# Patient Record
(demographics unavailable — no encounter records)

---

## 2018-04-13 NOTE — RADIOLOGY REPORT (SQ)
EXAM DESCRIPTION:  U/S THYROID/SFT TISS HD   NECK



COMPLETED DATE/TIME:  4/13/2018 3:41 pm



REASON FOR STUDY:  R THYROID NODULE E04.1  NONTOXIC SINGLE THYROID NODULE



COMPARISON:  None.



TECHNIQUE:  Dynamic and static gray-scale images acquired of the thyroid gland. Selected additional c
olor/power Doppler images recorded.  All images stored to PACS.



LIMITATIONS:  None.



FINDINGS:  RIGHT LOBE: Normal size, 3.5 x 2.5 x 2 cm.  Homogeneous echotexture.  There is a complex m
ass right lower pole thyroid with increased color flow, 2.7 x 2.3 x 2 cm in size.  Consider biopsy.

LEFT LOBE: Normal size, 3.7 x 1.6 x 1.3 cm.  Homogeneous echotexture.  Small solid nodule 6 x 3 mm.

ISTHMUS: Normal size.  Homogeneous echotexture.  No cystic or solid masses.

OTHER: No other significant finding.



IMPRESSION:  Complex mass right lower pole thyroid.  Ultrasound guided biopsy recommended



TECHNICAL DOCUMENTATION:  JOB ID:  [id number]

 2011 [employer]- All Rights Reserved



Reading location - IP/workstation name: [email or web address]

## 2018-07-06 NOTE — ER DOCUMENT REPORT
ED General





- General


Chief Complaint: Upper Abdominal Pain


Stated Complaint: STOMACH PAIN


Time Seen by Provider: 07/06/18 08:35


Mode of Arrival: Ambulatory


Information source: Patient, [facility] Records


Notes: 





66-year-old female with fibromyalgia, osteoarthritis devious history of 

pancreatitis presents with complaint of right upper quadrant abdominal pain 

that started 6 days prior to arrival.  Patient describes the pain as 

intermittent, stabbing with radiation to her back.  She states does state that 

is worse with food.  Patient had pancreatitis approximately 3 months ago and 

was told it was secondary to long-term Diflucan use.  She denies any fever, 

chills, chest pain.  She admits to nausea without vomiting, dysuria, loose 

stool.


TRAVEL OUTSIDE OF THE [place] IN LAST 30 DAYS: No





- HPI


Onset: Last week


Quality of pain: Achy


Severity: Mild


Associated symptoms: Nausea.  denies: Chest pain, Vomiting, Shortness of breath


Exacerbated by: Movement


Relieved by: Denies


Similar symptoms previously: Yes


Recently seen / treated by doctor: No





- Related Data


Allergies/Adverse Reactions: 


 





aspirin [Aspirin] Adverse Reaction (Severe, Verified 07/06/18 07:58)


 ABDOMINAL PAIN


codeine [Codeine] Adverse Reaction (Severe, Verified 07/06/18 07:58)


 ABDOMINAL PAIN


sulfamethoxazole [From Septra] Adverse Reaction (Severe, Verified 07/06/18 07:58

)


 ABDOMINAL PAIN


trimethoprim [From Septra] Adverse Reaction (Severe, Verified 07/06/18 07:58)


 ABDOMINAL PAIN











Past Medical History





- General


Information source: Patient, [facility] Records





- Social History


Smoking Status: Never Smoker


Frequency of alcohol use: None


Drug Abuse: None


Lives with: Spouse/Significant other


Family History: Reviewed & Not Pertinent


Patient has suicidal ideation: No


Patient has homicidal ideation: No





- Past Medical History


Cardiac Medical History: 


   Denies: Hx Coronary Artery Disease, Hx Heart Attack, Hx Hypertension


Pulmonary Medical History: 


   Denies: Hx Asthma, Hx Bronchitis, Hx COPD, Hx Pneumonia


Neurological Medical History: Denies: Hx Cerebrovascular Accident, Hx Seizures


Renal/ Medical History: Denies: Hx Peritoneal Dialysis


Musculoskeltal Medical History: Denies Hx Arthritis


Past Surgical History: Reports: Hx Hysterectomy





- Immunizations


Hx Diphtheria, Pertussis, Tetanus Vaccination: Yes





Review of Systems





- Review of Systems


Notes: 





REVIEW OF SYSTEMS:


CONSTITUTIONAL :  Denies fever,  chills, or sweats.  Denies recent illness. 

Denies weight loss, recent hospitalizations. 


EENT: Denies visual changes, eye pain.    Denies nasal or sinus congestion or 

discharge.  Denies sore throat, oral lesions, difficulty swallowing.


CARDIOVASCULAR:  Denies chest pain.  Denies palpitations. Denies lower 

extremity edema.


RESPIRATORY:  Denies cough, cold, or chest congestion.  Denies shortness of 

breath, wheezing.


GASTROINTESTINAL:  Denies abdominal  distention.  Denies vomiting,   Denies 

blood in vomitus, stools, or per rectum.  Denies black, tarry stools.  Denies 

constipation.  


GENITOURINARY:  Denies difficulty urinating, painful urination,  frequency, 

blood in urine, or  vaginal discharge.


MUSCULOSKELETAL:  Denies back or neck pain or stiffness.  Denies joint pain or 

swelling.


SKIN:   Denies rash, lesions or sores.


HEMATOLOGIC :   Denies easy bruising or bleeding.


LYMPHATIC:  Denies swollen glands.


NEUROLOGICAL:  Denies confusion or altered mental status.  Denies passing out 

or loss of consciousness.  Denies dizziness or lightheadedness.  Denies 

headache.  Denies weakness or paralysis.  Denies problems difficulty with 

ambulation, slurred speech.  Denies sensory loss, numbness, or tingling.  

Denies seizures.


PSYCHIATRIC:  Denies anxiety or stress.  Denies depression, suicidal ideation, 

or homicidal ideation.  Denies visual or auditory hallucinations.








Physical Exam





- Vital signs


Vitals: 


 











Temp Pulse Resp BP Pulse Ox


 


 98.8 F   82   20   143/79 H  99 


 


 07/06/18 08:01  07/06/18 08:01  07/06/18 08:01  07/06/18 08:01  07/06/18 08:01














- Notes


Notes: 





PHYSICAL EXAMINATION:





GENERAL: Well-appearing, well-nourished and in no acute distress.





HEAD: Atraumatic, normocephalic.





EYES: Pupils equal round and reactive to light, extraocular movements intact, 

conjunctiva are normal.





ENT: Nares patent, oropharynx clear without exudates.  Moist mucous membranes.





NECK: Normal range of motion, supple without lymphadenopathy





LUNGS: Breath sounds clear to auscultation bilaterally and equal.  No wheezes 

rales or rhonchi.





HEART: Regular rate and rhythm without murmurs





ABDOMEN: Soft, tender to palpation in the right upper quadrant.  Nondistended 

abdomen.  No guarding, no rebound.  No masses appreciated.





Female : deferred





Musculoskeletal: Normal range of motion, no pitting or edema.  No cyanosis.





NEUROLOGICAL: Cranial nerves grossly intact.  Normal speech, normal gait.  

Normal sensory, motor exams





PSYCH: Normal mood, normal affect.





SKIN: Warm, Dry, normal turgor, no rashes or lesions noted.





Course





- Re-evaluation


Re-evalutation: 


66-year-old female with fibromyalgia, osteoarthritis devious history of 

pancreatitis presents with complaint of right upper quadrant abdominal pain 

that started 6 days prior to arrival.  Patient describes the pain as 

intermittent, stabbing with radiation to her back.  She states does state that 

is worse with food.  Patient had pancreatitis approximately 3 months ago and 

was told it was secondary to long-term Diflucan use.  She denies any fever, 

chills, chest pain.  She admits to nausea without vomiting, dysuria, loose 

stool.  Patient was seen by myself upon arrival.  Vital signs were reviewed. 

Patient is afebrile, normotensive and not hypoxic.  Patient does not appear 

toxic or dehydrated.  They are in no acute distress.  Previous medical records 

and nursing notes reviewed.  Significant findings include a urinalysis 

consistent with urinary tract infection.  


07/06/18 11:49


Reevaluation patient reports that her nausea and pain have resolved.  She is 

tolerating fluids without difficulty.


07/06/18 16:04





07/06/18 16:04


Patient was provided Macrobid and Reglan in the department and will be 

prescribed the same medication for home.  Patient provided the opportunity to 

ask questions, and express concerns.  Discharge instructions discussed. Patient 

is agreeable with discharge home.  Return indications explained and discussed 

with the patient who displays understanding.  Patient encouraged to return to 

the emergency department immediately with any concerns.





- Vital Signs


Vital signs: 


 











Temp Pulse Resp BP Pulse Ox


 


 98.1 F   57 L  14   105/66   98 


 


 07/06/18 12:04  07/06/18 12:04  07/06/18 12:04  07/06/18 12:04  07/06/18 12:04














- Laboratory


Result Diagrams: 


 07/06/18 09:23





 07/06/18 09:23


Laboratory results interpreted by me: 


 











  07/06/18 07/06/18 07/06/18





  09:23 09:23 09:53


 


Seg Neutrophils %  80.7 H  


 


Sodium   135.0 L 


 


Chloride   96 L 


 


BUN   4 L 


 


Glucose   145 H 


 


Total Protein   8.4 H 


 


Ur Leukocyte Esterase    MODERATE H














Discharge





- Discharge


Clinical Impression: 


Abdominal pain


Qualifiers:


 Abdominal location: right upper quadrant Qualified Code(s): R10.11 - Right 

upper quadrant pain





UTI (urinary tract infection)


Qualifiers:


 Urinary tract infection type: acute cystitis Hematuria presence: without 

hematuria Qualified Code(s): N30.00 - Acute cystitis without hematuria





Condition: Good


Disposition: HOME, SELF-CARE


Instructions:  Urinary Tract Infection ([facility])


Prescriptions: 


Metoclopramide HCl [Reglan 10 mg Tablet] 1 tab PO Q8H PRN #25 tablet


 PRN Reason: 


Nitrofurantoin Monohyd/M-Cryst [Macrobid 100 mg Capsule] 1 tab PO BID #14 

capsule


Referrals: 


BRETT RICHARDSON MD [Primary Care Provider] - Follow up in 1 week

## 2018-10-16 NOTE — RADIOLOGY REPORT (SQ)
EXAM DESCRIPTION:  U/S RETROPERITON (RENAL/AORTA)



COMPLETED DATE/TIME:  10/16/2018 11:35 am



REASON FOR STUDY:  UTI (N39.0) N39.0  URINARY TRACT INFECTION, SITE NOT SPECIFIED



COMPARISON:  None.



TECHNIQUE:  Dynamic and static grayscale images acquired of the kidneys and bladder and recorded on P
ACS. Additional selected color Doppler and spectral images recorded.



LIMITATIONS:  None.



FINDINGS:  RIGHT KIDNEY: Normal size. Normal echogenicity. No solid or suspicious masses. No hydronep
hrosis. No calcifications.

LEFT KIDNEY:  Normal size. Normal echogenicity. No solid or suspicious masses. No hydronephrosis. No 
calcifications.

BLADDER: The bladder is incompletely distended.

OTHER FINDINGS: No other significant finding.



IMPRESSION:  NORMAL RENAL AND BLADDER ULTRASOUND.



TECHNICAL DOCUMENTATION:  JOB ID:  [id number]

 2011 [employer]- All Rights Reserved



Reading location - IP/workstation name: [email or web address]

## 2019-01-13 NOTE — RADIOLOGY REPORT (SQ)
CLINICAL HISTORY:  sharp LLQ pain, vomiting, bloody stool 



COMPARISON: None.



TECHNIQUE: CT ABDOMEN PELVIS WITH IV CONTRAST on 1/13/2019 3:59

AM CST



This exam was performed according to our departmental

dose-optimization program, which includes automated exposure

control, adjustment of the mA and/or kV according to patient size

and/or use of iterative reconstruction technique.



FINDINGS: 



Lower lungs are clear.



Abdomen: The liver is normal in appearance. There is no biliary

dilatation. Gallbladder is normal in appearance. The pancreas and

spleen are normal in appearance. There is mild fullness of the

right renal collecting system. Left kidney and both adrenal

glands are unremarkable.



Abdominal aorta is normal in course and caliber without aneurysm.

There is no free air. There is no retroperitoneal adenopathy.



Pelvis: There is mild to moderate thickening of essentially the

entire descending colon. There is mild surrounding inflammation.

Urinary bladder is unremarkable. There is no free fluid. Appendix

is normal. There is a 3 mm calcification in the area of the lower

third of the right ureter.



Skeleton: There are no acute osseous findings. No suspicious bony

lesions.



IMPRESSION: 



Moderate long segment area of infectious or inflammatory colitis

involving the descending colon.



Questionable 3 mm calculus in the lower third of the right ureter

## 2019-01-13 NOTE — ER DOCUMENT REPORT
ED GI/





- General


Chief Complaint: Rectal Bleeding


Stated Complaint: VOMITING


Time Seen by Provider: 01/13/19 03:45


Notes: 


Patient is a 66-year-old female who comes emergency part for chief complaint of 

abdominal pain, vomiting, bloody stools.  She states that at 5 AM she woke up, 

started having sharp pain in her lateral abdomen, vomited several times, had 

several episodes of loose stools, she noticed bright red blood in the stool 

mixed in.  She states she has had abdominal pain in the lower abdomen 

persistently all day.  She denies fever or chills.  She states that whenever she

has tried to eat or drink the pain has become much worse.  Past medical history 

includes hiatal hernia, on Protonix, hysterectomy, hypertension, hyperlipidemia.

 She denies smoking or alcohol.





TRAVEL OUTSIDE OF THE [place] IN LAST 30 DAYS: No





- Related Data


Allergies/Adverse Reactions: 


                                        





aspirin [Aspirin] Adverse Reaction (Severe, Verified 07/06/18 07:58)


   ABDOMINAL PAIN


codeine [Codeine] Adverse Reaction (Severe, Verified 07/06/18 07:58)


   ABDOMINAL PAIN


sulfamethoxazole [From Septra] Adverse Reaction (Severe, Verified 07/06/18 

07:58)


   ABDOMINAL PAIN


trimethoprim [From Septra] Adverse Reaction (Severe, Verified 07/06/18 07:58)


   ABDOMINAL PAIN











Past Medical History





- General


Information source: Patient





- Social History


Smoking Status: Never Smoker


Frequency of alcohol use: None


Drug Abuse: None


Lives with: Family


Family History: Reviewed & Not Pertinent





- Past Medical History


Cardiac Medical History: Reports: Hx Hypercholesterolemia, Hx Hypertension


   Denies: Hx Coronary Artery Disease, Hx Heart Attack


Pulmonary Medical History: 


   Denies: Hx Asthma, Hx Bronchitis, Hx COPD, Hx Pneumonia


Neurological Medical History: Denies: Hx Cerebrovascular Accident, Hx Seizures


Renal/ Medical History: Denies: Hx Peritoneal Dialysis


GI Medical History: Reports: Hx Gastroesophageal Reflux Disease


Musculoskeletal Medical History: Denies Hx Arthritis


Past Surgical History: Reports: Hx Hysterectomy





- Immunizations


Hx Diphtheria, Pertussis, Tetanus Vaccination: Yes





Review of Systems





- Review of Systems


Constitutional: No symptoms reported


EENT: No symptoms reported


Cardiovascular: No symptoms reported


Respiratory: No symptoms reported


Gastrointestinal: See HPI


Genitourinary: No symptoms reported


Female Genitourinary: No symptoms reported


Musculoskeletal: No symptoms reported


Skin: No symptoms reported


Hematologic/Lymphatic: No symptoms reported


Neurological/Psychological: No symptoms reported





Physical Exam





- Vital signs


Vitals: 


                                        











Temp Pulse Resp BP Pulse Ox


 


 98.8 F   91   16   142/79 H  99 


 


 01/13/19 02:57  01/13/19 02:57  01/13/19 02:57  01/13/19 02:57  01/13/19 02:57














- Notes


Notes: 





GENERAL: Alert, interacts well. No acute distress.


HEAD: Normocephalic, atraumatic.


EYES: Pupils equal, round, and reactive to light. Extraocular movements intact.


ENT: Oral mucosa moist, tongue midline. Oropharynx unremarkable. Airway patent. 

Nares patent, no nasal septal hematoma, TM's intact.


NECK: Full range of motion. Supple. Trachea midline.


LUNGS: Clear to auscultation bilaterally, no wheezes, rales, or rhonchi. No 

respiratory distress.


HEART: Regular rate and rhythm. No murmur


ABDOMEN: Tenderness in the left mid to lower quadrant.  Patient is not guarding.

 Right abdomen unremarkable.  Upper abdomen unremarkable.  No rigidity or 

distention noted.


GENITOURINARY: Deferred


RECTAL: Questionable minimal bright red blood, no black stool, no heavy 

bleeding, no hemorrhoid, no other concerning findings noted.  Palma DENNIS present 

during exam.


EXTREMITIES: Moves all 4 extremities spontaneously. No edema, normal radial and 

dorsalis pedis pulses bilaterally. No cyanosis.


BACK: no cervical, thoracic, lumbar midline tenderness. No saddle anesthesia, 

normal distal neurovascular exam. 


NEUROLOGICAL: Alert and oriented x3. Normal speech. [cranial nerves II through 

XII grossly intact]. 


PSYCH: Normal affect, normal mood.


SKIN: Warm, dry, normal turgor. No rashes or lesions noted.





Course





- Re-evaluation


Re-evalutation: 


CBC unremarkable.  Chemistry unremarkable.  Urinalysis unremarkable.  Lactic 

acid is not elevated.  Patient has left lower quadrant pain on evaluation, 

symptoms very suggestive of colitis versus diverticulitis.  Low suspicion of isc

hemic colitis based on her appearance, workup, and unremarkable lactic acid.





CAT scan was performed, this does show colitis over the left descending colon.  

This is consistent with patient's presentation.  Also shows a possible right 

ureteral stone, however patient has no hematuria, no right-sided abdominal pain,

no right flank pain.  I did discuss the possibility of a stone with patient.  

She has never had them before.  Since this is questionable I feel that this is 

unlikely.





I discussed results and plan with patient in detail.  Because she has no 

leukocytosis, fever, unstable vital signs, and her examination is still somewhat

benign, will try outpatient therapy first.  Patient will be discharged on 

Augmentin because she has had bad effects of Flagyl in the past including 

developing pancreatitis once and it was thought to be secondary to this, she 

requests a different method and after discussion Augmentin was agreed upon.  She

states she will follow-up very closely with her primary care provider.  She was 

provided with symptom relief.  I discussed strict return precautions.  Patient 

and  state understanding and agreement.  Stable at time of discharge.





- Vital Signs


Vital signs: 


                                        











Temp Pulse Resp BP Pulse Ox


 


 98.8 F   91   16   142/79 H  99 


 


 01/13/19 02:57  01/13/19 02:57  01/13/19 02:57  01/13/19 02:57  01/13/19 02:57














- Laboratory


Result Diagrams: 


                                 01/13/19 04:09





                                 01/13/19 04:09


Laboratory results interpreted by me: 


                                        











  01/13/19





  04:09


 


Glucose  127 H














Discharge





- Discharge


Clinical Impression: 


 LLQ abdominal pain, Nausea vomiting and diarrhea, Colitis





Condition: Stable


Disposition: HOME, SELF-CARE


Additional Instructions: 


Your workup indicates colitis.  We are treating this with the antibiotic 

Augmentin.  Take as directed.


I recommend a clear fluid diet for at least the first 24 hours.  Progress to 

bland diet.


Take the pain and nausea medication as prescribed if needed.


Follow-up with your provider within the next 3 days.


Return if you worsen in any way including fever/chills, severe worsening pain, 

frequent bloody stools, swelling of the abdomen, uncontrolled vomiting, or any 

other concerning or worsening symptoms.


Prescriptions: 


Amox Tr/Potassium Clavulanate [Augmentin 875-125 Tablet] 1 tab PO TID 7 Days #21

tablet


Morphine Sulfate [Morphine Ir 15 Mg Tablet] 15 mg PO TID PRN #15 tablet


 PRN Reason: 


Ondansetron [Zofran Odt 4 mg Tablet] 1 - 2 tab PO Q4H PRN #15 tab.rapdis


 PRN Reason: For Nausea/Vomiting


Referrals: 


AVRIL LIM [Primary Care Provider] - Follow up as needed

## 2019-03-31 NOTE — ER DOCUMENT REPORT
ED General





- General


Chief Complaint: Abdominal Pain


Stated Complaint: ABDOMINAL PAIN


Time Seen by Provider: 03/31/19 08:13


Primary Care Provider: 


CARIDAD LYON MD [ACTIVE STAFF] - Follow up in 3-5 days


AVRIL LIM [NO LOCAL MD] - Follow up in 3-5 days


Mode of Arrival: Ambulatory


Information source: Patient


TRAVEL OUTSIDE OF THE [place] IN LAST 30 DAYS: No





- HPI


Notes: 





66-year-old female presents the ED by private car for complaints of abdominal 

pain, bloody stool and vomiting that started last night.  Patient had an episode

of colitis back in January 2019 was seen at [place] [facility], she did show to have 

colitis on CT scan.  Patient did follow-up with her primary care provider she 

never had a colonoscopy since then due to her PCM her to get a colonoscopy in 

the spring 2019 however she did have been in his copy which showed she does have

mild acid reflux, has been taking Prilosec daily.  Denies any new travel, new 

foods, new medications.  Patient denies any recent trauma.  No bleeding 

disorders, patient states she has had 3 loose stools.  Denies any history of 

IBS.  Patient states this episode feels very similar to her previous episode of 

colitis. history of hypertension and hyperlipidemia did have a hysterectomy.  

Patient does not take a baby aspirin daily.  Denies fevers, chills,  chest 

pain,palpitations,  shortness of breath, dyspnea, nausea, vhematuria,blurred 

vision, double vision, loss of vision, speech changes, LH, dizziness, syncope, 

headaches, neck pain, weakness, bowel or bladder dysfunction, saddle anesthesia,

numbness or tingling in bilateral upper or lower extremities equally, muscle 

paralysis, weakness in bilateral upper or lower extremities equally or rash. 





- Related Data


Allergies/Adverse Reactions: 


                                        





aspirin [Aspirin] Adverse Reaction (Severe, Verified 03/31/19 07:14)


   ABDOMINAL PAIN


codeine [Codeine] Adverse Reaction (Severe, Verified 03/31/19 07:14)


   ABDOMINAL PAIN


sulfamethoxazole [From Septra] Adverse Reaction (Severe, Verified 03/31/19 

07:14)


   ABDOMINAL PAIN


trimethoprim [From Septra] Adverse Reaction (Severe, Verified 03/31/19 07:14)


   ABDOMINAL PAIN











Past Medical History





- General


Information source: Patient





- Social History


Smoking Status: Unknown if Ever Smoked


Family History: Reviewed & Not Pertinent


Patient has suicidal ideation: No


Patient has homicidal ideation: No





- Past Medical History


Cardiac Medical History: Reports: Hx Hypercholesterolemia, Hx Hypertension


   Denies: Hx Coronary Artery Disease, Hx Heart Attack


Pulmonary Medical History: 


   Denies: Hx Asthma, Hx Bronchitis, Hx COPD, Hx Pneumonia


Neurological Medical History: Denies: Hx Cerebrovascular Accident, Hx Seizures


Renal/ Medical History: Denies: Hx Peritoneal Dialysis


GI Medical History: Reports: Hx Gastroesophageal Reflux Disease


Musculoskeletal Medical History: Denies Hx Arthritis


Past Surgical History: Reports: Hx Hysterectomy





- Immunizations


Hx Diphtheria, Pertussis, Tetanus Vaccination: Yes





Review of Systems





- Review of Systems


Constitutional: No symptoms reported


EENT: No symptoms reported


Cardiovascular: No symptoms reported


Respiratory: No symptoms reported


Gastrointestinal: See HPI


Genitourinary: No symptoms reported


Female Genitourinary: No symptoms reported


Musculoskeletal: No symptoms reported


Skin: No symptoms reported


Hematologic/Lymphatic: No symptoms reported


Neurological/Psychological: No symptoms reported





Physical Exam





- Vital signs


Vitals: 


                                        











Temp Pulse Resp BP Pulse Ox


 


 98.5 F   85   22 H  125/71   98 


 


 03/31/19 07:14  03/31/19 07:14  03/31/19 07:14  03/31/19 07:14  03/31/19 07:14














- Notes


Notes: 





PHYSICAL EXAMINATION:





GENERAL: Well-appearing, well-nourished and in no acute distress.





HEAD: Atraumatic, normocephalic.





EYES: Pupils equal round and reactive to light, extraocular movements intact, 

conjunctiva are normal.





ENT: Nares patent, oropharynx clear without exudates.  Moist mucous membranes.





NECK: Normal range of motion, supple without lymphadenopathy





LUNGS: Breath sounds clear to auscultation bilaterally and equal.  No wheezes 

rales or rhonchi.





HEART: Regular rate and rhythm without murmurs





ABDOMEN: Hyperactive bowel sounds soft, generalized abdominal tenderness 

nontender, nondistended abdomen.  No guarding, no rebound.  No masses 

appreciated.  Guaiac positive





Female : deferred





Musculoskeletal: Normal range of motion, no pitting or edema.  No cyanosis.





NEUROLOGICAL: Cranial nerves grossly intact.  Normal speech, normal gait.  

Normal sensory, motor exams





PSYCH: Normal mood, normal affect.





SKIN: Warm, Dry, normal turgor, no rashes or lesions noted. 





Course





- Re-evaluation


Re-evalutation: 





03/31/19 12:33


66-year-old female afebrile vitals stable no distress presents for evaluation of

abdominal pain, bloody stools.  CBC and CMP unremarkable.  Urinalysis does show 

some ketones, otherwise normal.  Low suspicion of ischemic colitis based on her 

appearance and laboratory studies.  CT abdomen pelvis with IV and oral contrast 

does show healing of the descending colon.  Guaiac was positive however patient 

is vomiting radha blood.  No noted hemorrhoids normal sphincter tone, no 

hemorrhoids or palpable masses on rectal exam.  Discussed with patient that she 

does need to follow-up with a gastroenterologist for a colonoscopy, referral 

given.  Advised to take antibiotics as directed.  Discussed with patient that in

previous note states that patient had a reaction to Flagyl, she states she 

realized that when she told the provider the last time this was she actually has

a reaction to Diflucan, also known as fluconazole, and she missed does smoke.  

States she does not have a reaction to Flagyl.  Discussed with her that she 

needs to follow a bland diet, take antibiotics as directed.  With primary care 

provider within 24-48 hours . do not drive, drink or operate machinery while 

taking the medication as it can cause sedation and impairment of cognitive 

function.  After performing a Medical Screening Examination, I estimate there is

LOW risk for ACUTE APPENDICITIS, BOWEL OBSTRUCTION, ACUTE CHOLECYSTITIS, 

PERFORATED DIVERTICULITIS, INCARCERATED HERNIA, PANCREATITIS, PELVIC 

INFLAMMATORY DISEASE, PERFORATED ULCER, ECTOPIC PREGNANCY, or TUBO-OVARIAN 

ABSCESS, thus I consider the discharge disposition reasonable. Also, there is no

evidence or peritonitis, sepsis, or toxicity. I have reevaluated this patient 

multiple times and no significant life threatening changes are noted. The 

patient and I have discussed the diagnosis and risks, and we agree with 

discharging home with close follow-up with the understanding that symptoms and 

presentations can change. We also discussed returning to the Emergency 

Department immediately if new or worsening symptoms occur. We have discussed the

symptoms which are most concerning (e.g., bloody stool, fever, changing or 

worsening pain, vomiting) that necessitate immediate return.





- Vital Signs


Vital signs: 


                                        











Temp Pulse Resp BP Pulse Ox


 


 98.4 F   85   20   134/78 H  95 


 


 03/31/19 14:24  03/31/19 07:14  03/31/19 14:24  03/31/19 14:24  03/31/19 14:24














- Laboratory


Result Diagrams: 


                                 03/31/19 07:59





                                 03/31/19 07:59


Laboratory results interpreted by me: 


                                        











  03/31/19 03/31/19





  07:59 09:30


 


Glucose  115 H 


 


Urine Ketones   TRACE H














Discharge





- Discharge


Clinical Impression: 


 Colitis





Condition: Stable


Disposition: HOME, SELF-CARE


Instructions:  Colitis, Nonspecific ([facility]), Nausea or Vomiting, Nonspecific ([facility])


Additional Instructions: 


Colitis, Nonspecific





     Colitis is an inflammatory disease of the large intestine which affects the

lining of the bowel.  The cause is uncertain, though it is often caused by an 

infection.  In some cases, the symptoms resolve and can return again in the 

future.


     Colitis is characterized by abdominal pain, often nausea and vomiting, and 

either diarrhea or difficulty with bowel movements.  Sometimes blood will be 

present in the bowel movements.  Fever is often present as well.


     Milder cases of colitis can be managed as an outpatient with medications 

for nausea and vomiting and pain, oral fluid therapy, and perhaps antibiotics, 

if a bacterial origin is suspected.  Antidiarrhea medicine should usually be 

avoided in colitis.


     If you have increasing abdominal pain, repeated vomiting, fever, rectal 

bleeding, or worsening diarrhea, you should return for re-evaluation.








take antibiotics as directed, probiotic 2 hours prior to hours after taking 

antibiotic.  Follow-up with gastroenterologist for colonoscopy within 1 week, 

follow-up with PCP within 24-48 hours.  Return to ED if you experience any 

fevers, worsening abdominal pain, weakness, lethargy, etc.





Return immediately for any new or worsening symptoms.





Follow up with primary care provider, call tomorrow to make followup 

appointment.


Prescriptions: 


Ciprofloxacin HCl [Cipro 500 mg Tablet] 500 mg PO BID #20 tablet


Metronidazole [Flagyl 500 mg Tablet] 500 mg PO BID #20 tablet


Ondansetron [Zofran Odt 4 mg Tablet] 1 - 2 tab PO Q4H PRN #15 tab.rapdis


 PRN Reason: For Nausea/Vomiting


Forms:  Return to Work


Referrals: 


CARIDAD LYON MD [ACTIVE STAFF] - Follow up in 3-5 days


AVRIL LIM [NO LOCAL MD] - Follow up in 3-5 days

## 2019-03-31 NOTE — RADIOLOGY REPORT (SQ)
EXAM DESCRIPTION:  CT ABD/PELVIS WITH IV   ORAL



COMPLETED DATE/TIME:  3/31/2019 12:04 pm



REASON FOR STUDY:  RLQ/LLQ pain w/ vomiting/bloody stool



COMPARISON:  1/13/2019.



TECHNIQUE:  CT scan of the abdomen and pelvis performed with intravenous and oral contrast using joey
dex scanning technique with dynamic intravenous contrast injection. Images reviewed with lung, soft t
issue, and bone windows. Reconstructed coronal and sagittal MPR images reviewed. Delayed images for e
valuation of the urinary system also acquired. All images stored on PACS.

All CT scanners at this facility use dose modulation, iterative reconstruction, and/or weight based d
osing when appropriate to reduce radiation dose to as low as reasonably achievable (ALARA).

[facility]: Dose Right  [facility]: CareDose    [facility]: Dose Right    [facility]: Teradose 4D    [facility]: [employer] Technologies



CONTRAST TYPE AND DOSE:  contrast/concentration: Isovue 350.00 mg/ml; Total Contrast Delivered: 78.0 
ml; Total Saline Delivered: 67.0 ml



RENAL FUNCTION:  BUN 9 creatinine 0.73.



RADIATION DOSE:  CT Rad equipment meets quality standard of care and radiation dose reduction techniq
ues were employed. CTDIvol: 7.9 - 10.8 mGy. DLP: 951 mGy-cm..



LIMITATIONS:  None.



FINDINGS:  LOWER CHEST: No significant findings. No nodules or infiltrates.

LIVER: Normal size. No masses.  No dilated ducts.

SPLEEN: Normal size. No focal lesions.

PANCREAS: No masses. No significant calcifications. No adjacent inflammation or peripancreatic fluid 
collections. Pancreatic duct not dilated.

GALLBLADDER: No identified stones by CT criteria. No inflammatory changes to suggest cholecystitis.

ADRENAL GLANDS: No significant masses or asymmetry.

RIGHT KIDNEY AND URETER: No solid masses. No significant calcification. No hydronephrosis or hydroure
ter.

LEFT KIDNEY AND URETER: No solid masses. No significant calcification. No hydronephrosis or hydrouret
er.

AORTA AND VESSELS: No aneurysm. No dissection. Renal arteries, SMA, celiac without stenosis.

RETROPERITONEUM: No retroperitoneal adenopathy, hemorrhage or masses.

BOWEL AND PERITONEAL CAVITY: No obstruction. No visualized masses. No free fluid.  Mild bowel wall th
ickening of the descending colon.

APPENDIX: Normal.

PELVIS: No significant masses.  Normal bladder. No free fluid.

ABDOMINAL WALL: No masses. No hernias.

BONES: No significant or acute findings.

OTHER: No other significant finding.



IMPRESSION:

1. MILD BOWEL WALL THICKENING OF THE DESCENDING COLON.  SIMILAR APPEARANCE TO THE PRIOR STUDY.  THIS 
COULD BE ARTIFACT DUE TO LACK OF CONTRAST DISTENTION ALTHOUGH MILD COLITIS IS ANOTHER POSSIBILITY.

2. NO OTHER SIGNIFICANT OR ACUTE FINDINGS IN THE ABDOMEN OR PELVIS.



TECHNICAL DOCUMENTATION:  JOB ID:  [id number]

Quality ID # [id number]: Final reports with documentation of one or more dose reduction techniques (e.g., Au
tomated exposure control, adjustment of the mA and/or kV according to patient size, use of iterative 
reconstruction technique)

 2011 [employer]- All Rights Reserved



Reading location - IP/workstation name: [email or web address]

## 2019-12-05 NOTE — RADIOLOGY REPORT (SQ)
EXAM DESCRIPTION:  MRI PELVIS WITHOUT



COMPLETED DATE/TIME:  12/5/2019 10:45 am



REASON FOR STUDY:  ABDOMINAL PAIN R10.2  PELVIC AND PERINEAL PAIN



COMPARISON:  CT abdomen pelvis 3/31/2019, 1/13/2019

Pelvic ultrasound 1/28/2013



TECHNIQUE:  Multiplanar multisequence imaging performed without contrast including axial, sagittal an
d coronal T2, axial T1, sagittal inversion recovery.



LIMITATIONS:  None.



FINDINGS:  BLADDER AND URETHRA: No focal bladder wall thickening or nodularity.  Smooth mucosa.  The 
urethra has smooth contour with no focal asymmetry.  No urethrocele is identified.

PELVIC SOFT TISSUES: Normal.  No masses.

UTERUS: Surgically absent

RIGHT OVARY: Surgically absent

LEFT OVARY: Surgically absent

FREE FLUID: None.

PELVIC SKELETAL STRUCTURES: No abnormal marrow signal.

EXTRA PELVIS SOFT TISSUES: No masses.

OTHER: Urinary bladder is decompressed, mucosa not well seen.



IMPRESSION:  Post total hysterectomy.

No urethrocele is identified

Bladder decompressed, not well seen



TECHNICAL DOCUMENTATION:  JOB ID:  [id number]

 2011 [employer]- All Rights Reserved



Reading location - IP/workstation name: [email or web address]